# Patient Record
Sex: MALE | Race: WHITE | HISPANIC OR LATINO | URBAN - METROPOLITAN AREA
[De-identification: names, ages, dates, MRNs, and addresses within clinical notes are randomized per-mention and may not be internally consistent; named-entity substitution may affect disease eponyms.]

---
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
## 2022-06-22 NOTE — ED ADULT NURSE REASSESSMENT NOTE - NSIMPLEMENTINTERV_GEN_ALL_ED
Implemented All Universal Safety Interventions:  [place] to call system. Call bell, personal items and telephone within reach. Instruct patient to call for assistance. Room bathroom lighting operational. Non-slip footwear when patient is off stretcher. Physically safe environment: no spills, clutter or unnecessary equipment. Stretcher in lowest position, wheels locked, appropriate side rails in place. [facility]  Orthopedic Surgery  [address], 3rd & 4th [place] [address]  Phone: [phone]  Fax:     [facility]  Orthopedic Surgery  [address] [address]  Phone: [phone]  Fax:

## 2022-08-05 NOTE — PRE-OP CHECKLIST - CHLOROHEXIDINE WASH IN ASU
Physical Therapy Treatment    Patient Name:  Rohit Tello   MRN:  [id number]    Recommendations:     Discharge Recommendations:  rehabilitation facility   Discharge Equipment Recommendations:  (tbd pending progress)   Barriers to discharge: Inaccessible home and Decreased caregiver support    Assessment:     Rohit Tello is a 40 y.o. male admitted with a medical diagnosis of Hyponatremia.  He presents with the following impairments/functional limitations:  weakness, impaired balance, decreased safety awareness, impaired endurance, impaired self care skills, impaired functional mobility, gait instability, decreased lower extremity function. Pt participated, and tolerated treatment well. Pt will continue to benefit from skilled PT services to improve all deficits noted above. Resume PT POC as indicated.     Rehab Prognosis: Good; patient would benefit from acute skilled PT services to address these deficits and reach maximum level of function.    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) Stealth Microscope  (Bilateral)  FESS, USING COMPUTER-ASSISTED NAVIGATION (Bilateral) 11 Days Post-Op    Plan:     During this hospitalization, patient to be seen 4 x/week to address the identified rehab impairments via gait training, therapeutic activities, therapeutic exercises, neuromuscular re-education and progress toward the following goals:    · Plan of Care Expires:  08/31/22    Subjective     Chief Complaint: none stated  Patient/Family Comments/goals: none stated  Pain/Comfort:  · Pain Rating 1: 0/10  · Pain Rating Post-Intervention 1: 0/10      Objective:     Communicated with nursing prior to session.  Patient found supine with  (all lines intact) upon PT entry to room.     General Precautions: Standard, fall   Orthopedic Precautions:N/A   Braces: N/A  Respiratory Status:  Room air     Functional Mobility:  · Bed Mobility:  Supine to Sit: stand by assistance  · Sit to Supine: stand by assistance  · Transfers:  Sit to Stand:  stand by assistance with no AD  · Gait: ~20ft w/ RW and CGA for safety.       AM-PAC 6 CLICK MOBILITY  Turning over in bed (including adjusting bedclothes, sheets and blankets)?: 3  Sitting down on and standing up from a chair with arms (e.g., wheelchair, bedside commode, etc.): 3  Moving from lying on back to sitting on the side of the bed?: 3  Moving to and from a bed to a chair (including a wheelchair)?: 3  Need to walk in hospital room?: 3  Climbing 3-5 steps with a railing?: 3  Basic Mobility Total Score: 18       Therapeutic Activities and Exercises:   -BLE therex x20 reps: LAQ,HF,Hip abd/add    Patient left supine with all lines intact, call button in reach and nursing notified..    GOALS:   Multidisciplinary Problems     Physical Therapy Goals        Problem: Physical Therapy    Goal Priority Disciplines Outcome Goal Variances Interventions   Physical Therapy Goal     PT, PT/OT Ongoing, Progressing     Description: Goals to be met by: 22     Patient will increase functional independence with mobility by performin. Supine to sit with [place]  2. Sit to supine with [place]  3. Sit to stand transfer with Supervision  4. Bed to chair transfer with Stand-by Assistance using LRAD if needed  5. Gait  x 250 feet with Stand-by Assistance using LRAD if needed.   6. Ascend/descend 6 stair with bilateral Handrails and Contact Guard Assistance  7. Lower extremity exercise program x15 reps per handout, with supervision                     Time Tracking:     PT Received On: 22  PT Start Time: 1043     PT Stop Time: 1058  PT Total Time (min): 15 min     Billable Minutes: Therapeutic Activity 15    Treatment Type: Treatment  PT/PTA: PTA     PTA Visit Number: 2     2022   21-Oct-2019

## 2024-06-12 NOTE — ED ADULT NURSE NOTE - LOCATION
[facility] Department of Anesthesiology  Pre-Anesthesia Evaluation/Consultation       Name:  Juan Carlos Wong Sr  : 1962  Age:  62 y.o.                                           MRN:  [id number]  Date: 2024           Surgeon: Surgeon(s):  Dann Nuñez MD    Procedure: Procedure(s):  INCISION AND DRAINAGE RIGHT PROXIMAL ARM ABSCESS     No Known Allergies  Patient Active Problem List   Diagnosis   • Sepsis (HCC)   • Generalized weakness   • Acute on chronic congestive heart failure (HCC)   • Coronary artery disease involving native coronary artery of native heart without angina pectoris   • Non-traumatic rhabdomyolysis   • Neutrophilia   • Diabetic foot infection (HCC)   • Stage 3b chronic kidney disease (HCC)   • Cellulitis of foot   • Cellulitis and abscess of upper extremity     Past Medical History:   Diagnosis Date   • Back pain    • Diabetes mellitus (HCC)    • Pneumonia      Past Surgical History:   Procedure Laterality Date   • COLONOSCOPY     • LEG SURGERY Left    • LIVER BIOPSY       Social History     Tobacco Use   • Smoking status: Never   • Smokeless tobacco: Never   Substance Use Topics   • Alcohol use: No   • Drug use: No     Medications  No current facility-administered medications on file prior to encounter.     Current Outpatient Medications on File Prior to Encounter   Medication Sig Dispense Refill   • Tirzepatide (MOUNJARO) 5 MG/0.5ML SOPN SC injection Inject 0.5 mLs into the skin once a week 2 mL 1   • gabapentin (NEURONTIN) 300 MG capsule Take 1 capsule by mouth 2 times daily for 180 days. 180 capsule 1   • ondansetron (ZOFRAN-ODT) 4 MG disintegrating tablet Take 1 tablet by mouth 3 times daily as needed for Nausea or Vomiting 21 tablet 0   • cyclobenzaprine (FLEXERIL) 10 MG tablet Take 1 tablet by mouth 3 times daily as needed     • ketorolac (ACULAR) 0.5 % ophthalmic solution Place 1 drop into the right eye 4 times daily     • ofloxacin (OCUFLOX) 0.3 % solution  left elbow redness and swelling